# Patient Record
Sex: FEMALE | Race: WHITE
[De-identification: names, ages, dates, MRNs, and addresses within clinical notes are randomized per-mention and may not be internally consistent; named-entity substitution may affect disease eponyms.]

---

## 2017-05-17 ENCOUNTER — HOSPITAL ENCOUNTER (OUTPATIENT)
Dept: HOSPITAL 59 - SUR | Age: 72
Discharge: HOME | End: 2017-05-17
Attending: PAIN MEDICINE
Payer: COMMERCIAL

## 2017-05-17 DIAGNOSIS — M54.16: Primary | ICD-10-CM

## 2017-05-17 DIAGNOSIS — M54.17: ICD-10-CM

## 2017-05-17 PROCEDURE — 95972 ALYS CPLX SP/PN NPGT W/PRGRM: CPT

## 2017-05-17 PROCEDURE — 00300 ANES ALL PX INTEG H/N/PTRUNK: CPT

## 2017-05-17 PROCEDURE — 85002 BLEEDING TIME TEST: CPT

## 2017-05-17 PROCEDURE — 63685 INS/RPLC SPI NPG/RCVR POCKET: CPT

## 2017-05-17 PROCEDURE — 63650 IMPLANT NEUROELECTRODES: CPT

## 2017-05-17 PROCEDURE — 72020 X-RAY EXAM OF SPINE 1 VIEW: CPT

## 2017-05-17 NOTE — HISTORY AND PHYSICAL - FERRO
CHIEF COMPLAINT/HISTORY OF CHIEF COMPLAINT:  This patient presents with a 
history of an intractable lumbar radiculitis.  Due to the failure of all 
therapy a spinal cord stimulator trial was conducted on 4/10/17 with 75-90% 
pain control.  Due to the failure of all therapies and the success of the 
stimulator trial, the patient presents today for implantation of a permanent 
system.  



PAST MEDICAL HISTORY:  Hypertension.  



PAST SURGICAL HISTORY:  



MEDICATIONS ON ADMISSION:  List to be provided.  



ALLERGIES:  None identified.  



FAMILY/PSYCHOSOCIAL HISTORY:  Social history - Noncontributory.  Family history 
- Diabetes, thyroid disease, hypertension, and cerebrovascular disease.  



SYSTEMS REVIEW:  The patient seems appropriate in no acute distress.  The 
remainder of the systems review is positive for glasses, headaches, blood 
pressure problems, and degenerative arthritis.  



PHYSICAL EXAMINATION:  Height is 5'8", weight is 200 pounds.  Vital signs - Not 
available.  

HEENT:  Within normal limits.  

LUNGS:  Clear.

HEART:  Regular rate and rhythm.  

ABDOMEN:  Nontender.  

MUSCULOSKELETAL:  Examination of the musculoskeletal system shows diffuse 
tenderness throughout the lumbar spine.  Range of motion does cause pain 
throughout the low back and hip with both flexion and extension and a lower 
extremity component moving into both legs.  Ambulation - No assistive device 
utilized.  

NEUROLOGIC:  Cranial nerves are intact.  



IMPRESSION:  

LUMBAR RADICULITIS, ICD10 CODE M54.16 AND M54.17.



PLAN:  The patient is here for implantation of a permanent system spinal cord 
stimulator and internal generator based upon the successful trial and the 
failure of all other therapies.  The potential risks, side effects, and 
complications have all been carefully reviewed including dural puncture, nerve 
root injury, and spinal cord injury.  The patient understands and has 
consented.  We will consider this outpatient although an overnight stay will be 
evaluated.  







______________________________                  ____________________

KAROLINE MAGALLON D.O.                                            Date & Time



JOB NUMBER:  [id number]
MTDD

## 2017-05-23 NOTE — RADIOLOGY REPORT
EXAM:  AP THORACIC SPINE



HISTORY:  STIMULATOR PLACEMENT.



TECHNIQUE:  AP view of the thoracic spine was obtained.  



Comparison:  None.  



FINDINGS:  Two leads overlie the thoracic spine extending cephalad with the 
tips at the upper T6 level.  Mild to moderate dextroconvex scoliosis of the 
thoracic spine with at least mild degenerative disk disease.  



IMPRESSION:  

STIMULATOR LEADS OVERLIE THE THORACIC SPINE EXTENDING CEPHALAD TO THE UPPER T6 
LEVEL.  



JOB NUMBER:  [id number]
MTDD

## 2017-05-23 NOTE — OPERATIVE NOTE
DATE OF SURGERY: 05/17/2017 



PREOPERATIVE DIAGNOSIS: Intractable lumbar radiculitis, ICD10 code M54.16, M54.17. 



OPERATION:

1. Fluoroscopic-guided right translaminar epidural access T11-12, placement of spinal cord 
stimulator lead 1, a [employer] 16, 6 electrodes positioned left T6. 

2. Complex programming of lead 1 for 20 minutes. 

3. Fluoroscopic-guided epidural access right T12-L1 using a translaminar approach, placement of 
spinal cord stimulator lead 2, a [employer] 16, 6 electrodes positioned right T6. 

4. Complex programming of lead 2 for 20 minutes. 

5. Incision, subdissection, and anchoring leads 1 and 2 to supraspinous fascia using a [employer] locking anchor. 

6. Incision, subdissection, and creation of subcutaneous pouch at right posterior gluteal margin for 
placement of generator identified as a [employer] programmable rechargeable. 

7 .Tunneling between pouches, placement of external portion of lead 1 and lead 2 into generator 
pouch, each lead interfaced with bifurcate extension, each bifurcate extension interfaced to 
generator. 

8. Placement of generator into pouch, secured to posterior fascia using nonabsorbable suture, 
placement of leads in the pouch, closure of both incisions with Vicryl for fascia, running 
subcuticular Vicryl for skin, Dermabond closure. 

9. Complex recovery room programming internal generator home use 2 stimulators for 20 minutes. 



Surgeon: Luis Estrella DO



Anesthesia: Local with sedation. 



Anesthesia Provider: Albino Martinez 



Indication: This patient presents with a history of intractable lumbar radiculitis. Due to the 
failure of all therapies, a spinal cord stimulator trial was conducted in the office with 75% to 90% 
pain control. Due to the failure of all therapies and success of the trial, she opted to have a 
permanent implant. 



PROCEDURE: Intravenous line, vital sign monitoring, IV sedation by Anesthesia. Patient positioned 
prone. Sterile prep, sterile technique. The epidural interface right of the midline at T11-12 and 
12-1 infiltrated with local and then 2 separate curved access _____ needles with loss of resistance. 
At 11-12, spinal cord stimulator lead 1, a [employer] 16, 6 electrodes positioned 
left T6. With the epidural access right of the midline at 12-1, spinal cord stimulator lead 2, a 
[employer] 16, 6 electrodes positioned right of the midline at T6. Complex 
programming of lead 1 over 20 minutes followed by complex programming of lead 2 over 20 minutes 
resulting in pattern of stimulation across the back into the legs. Patient indicating we were in all 
the appropriate areas. She was given the option to implant the system, continue to program, or 
remove the system. She opted to implant. The question was repeated with the same response. At that 
point, the skin above and below both needles was infiltrated. An incision made and subcutaneous 
dissection was conducted at supraspinous fascia. Each lead was then anchored to the supraspinous 
fascia using a [employer] locking anchor. Right of the midline and below the belt line at the 
right posterior superior gluteal margin, a site picked by the patient for the generator. Skin 
infiltrated, incision made, and subcutaneous dissection was used to form a pouch of suitable size 
and depth. A tunneling tool was then used to carry both leads into the posterior gluteal pouch and 
then each lead was interfaced with bifurcate extension. Each bifurcate extension interfaced with the 
generator. Antibiotic irrigation and Bovie for hemostasis. The generator was then secured to the 
posterior fascia of the pouch using a nonabsorbable suture. The leads were placed in their own 
pouch, and then both incisions were closed with Vicryl for fascia and running subcuticular Vicryl 
for skin. A Dermabond closure was then used to approximate the edges of both wounds. She was 
transferred to the recovery room stable showing no side effects of the procedure or the sedation. 
When fully awake and alert, she was transported to the floor for observation. If she is fully awake 
and meets the criteria on the nursing floor, she can be discharged to home. 



DISCHARGE INSTRUCTIONS:

1. Sites to remain clean and dry although the Dermabond will allow showering. She should not shower 
until tomorrow or the next 24 hours. 

2. Standard medications resumed including Levaquin the antibiotic 500 mg once a day for 14 days. 

3. The office will contact the patient at home to evaluate and set up an evaluation for the 
incisional sites in 5-7 days. Until then, her activity should remain low. Limit bend, lift, push, 
pull. All other instructions provided, numbers to contact if problems given. At that point she was 
discharged. 



CC: Dr. Josh REDDY

## 2018-08-15 ENCOUNTER — HOSPITAL ENCOUNTER (OUTPATIENT)
Dept: HOSPITAL 59 - SUR | Age: 73
Discharge: HOME | End: 2018-08-15
Attending: PAIN MEDICINE
Payer: COMMERCIAL

## 2018-08-15 DIAGNOSIS — E78.00: ICD-10-CM

## 2018-08-15 DIAGNOSIS — J44.9: ICD-10-CM

## 2018-08-15 DIAGNOSIS — M54.17: ICD-10-CM

## 2018-08-15 DIAGNOSIS — K44.9: ICD-10-CM

## 2018-08-15 DIAGNOSIS — I10: ICD-10-CM

## 2018-08-15 DIAGNOSIS — G25.81: ICD-10-CM

## 2018-08-15 DIAGNOSIS — I71.4: ICD-10-CM

## 2018-08-15 DIAGNOSIS — Z95.5: ICD-10-CM

## 2018-08-15 DIAGNOSIS — R60.9: ICD-10-CM

## 2018-08-15 DIAGNOSIS — M54.16: Primary | ICD-10-CM

## 2018-08-15 DIAGNOSIS — Z79.01: ICD-10-CM

## 2018-08-15 PROCEDURE — 00300 ANES ALL PX INTEG H/N/PTRUNK: CPT

## 2018-08-15 PROCEDURE — 63685 INS/RPLC SPI NPG/RCVR POCKET: CPT

## 2018-08-15 PROCEDURE — 95972 ALYS CPLX SP/PN NPGT W/PRGRM: CPT

## 2018-08-15 NOTE — HISTORY AND PHYSICAL REPORT
DATE:  08/15/2018.



CHIEF COMPLAINT AND HISTORY OF CHIEF COMPLAINT:  This patient presents with a 
history of an intractable lumbar radiculopathy.  She had a spinal cord 
stimulator implant placed on 05/17/2017.  Although the system has been working 
well for her, there have been certain areas of her pain which we have been 
unable to capture through the current system.  A new generator has just been 
released within the last few months from [employer] which greatly 
enhances the functionality of the system providing more settings and greater 
control.  She is here for a battery change to improve the functionality of the 
system.



PAST MEDICAL HISTORY:  Hypertension.



PAST SURGICAL HISTORY:  Stimulator implant.



MEDICATIONS ON ADMISSION:  To be provided.



ALLERGIES:  None.



SOCIAL HISTORY:  Noncontributory.



FAMILY HISTORY:  Diabetes, thyroid disease, hypertension, cerebrovascular 
disease.



REVIEW OF SYSTEMS:  The patient is appropriate and in no acute distress.  The 
remainder of the systems review shows glasses, headaches, blood pressure 
problems, degenerative arthritis.



PHYSICAL EXAMINATION:  

General:  Height and weight unavailable.

Vital Signs:  Not available.

HEENT:  Within normal limits.

Lungs:  Clear.

Heart:  Regular rate and rhythm.

Abdomen:  Nontender.

Musculoskeletal:  Examination of the musculoskeletal system shows the generator 
pouch at the right posterior gluteal margin.  The incision is intact.  the 
primary underlying pain pattern is low back with a bilateral lower extremity 
extension.

Neurologic:  Cranial nerves are intact.



IMPRESSION:  

1.  LUMBAR RADICULOPATHY, ICD-10 CODE M54.16 AND M54.17.

2.  SPINAL CORD STIMULATOR INTERNAL GENERATOR NONFUNCTIONAL.



PLAN:  The patient is here on an outpatient basis for replacement of the 
generator.  The potential risks, side effects, and complications have all been 
carefully reviewed and discussed.



JOB NUMBER:  [id number]



cc:  NANO Dent

## 2018-08-16 NOTE — OPERATIVE NOTE - FERRO
DATE OF SURGERY:  08/15/18.



PREOPERATIVE DIAGNOSES:   

1.  INTRACTABLE LUMBAR RADICULOPATHY, ICD-10 CODE = M54.16 AND M54.17.

2.  SPINAL CORD STIMULATOR INTERNAL GENERATOR. 



SURGERY:          

FLUOROSCOPIC-GUIDED REMOVAL AND REPLACEMENT OF INTERNAL PULSE GENERATOR WITH A 
[employer] WAVEWRITER.  



SURGEON:  KAROLINE MAGALLON D.O.



ANESTHESIA:  LOCAL SEDATION.



ANESTHESIA PROVIDER:  LAUREN PHOENIX, CRNA.



INDICATIONS:  This patient presents with a history of intractable lumbar 
radiculopathy managed by spinal cord stimulation. She recently has had a 
certain amount of breakthrough pain, which has not been adequately covered by 
her stimulator. Multiple attempts at reprogramming failed to appreciably 
control these regions, which were upper back. She was given the option to 
change her generator to the new WaveWriter technology, which provides greater 
functionality, improved waveform generation, and more greater areas of coverage 
including moving higher into the lumbar and thoracic spine. She opted for a 
generator removal and replacement.  



SURGERY:  Intravenous line, vital sign monitoring, IV sedation, prepped and 
draped sterile technique. Patient position prone. Sterile prep, sterile 
technique at the right posterior superior gluteal margin. Generator pouch 
identified, marked and infiltrated with local, incision made, and subcutaneous 
dissection was conducted to the pouch. The pouch was opened and generator 
exteriorized and  from the internal leads. The new WaveWriter 
generator placed onto the field, interfaced with the indwelling leads. 
Antibiotic irrigation and Bovie for hemostasis. Nonabsorbable suture was placed 
to secure the generator into the pouch and then the generator pouch was closed 
using STRATAFIX suture, 2-0 for fascia and 3-0 running subcuticular for skin. 
Dermabond closure. She was transported to the Recovery Room stable, no side 
effects from the procedure or the sedation. When fully awake and alert, she was 
prepared for discharge. 



DISCHARGE INSTRUCTIONS:



1.  The site is to remain clean and dry. No sitting in water but she may 
shower. 

2.  Standard medications resumed including the antibiotic, Levaquin 500 mg once 
a day for 14 days. 

3.  She will be seen in the office in 7 to 10 days. Until then, she is to keep 
her activity levels low. All other instructions provided, numbers to contact if 
problems given. She will be discharged.  



cc: Dr. Ariana Weslh



JOB NUMBER:  [id number]
[facility]

## 2020-01-22 ENCOUNTER — HOSPITAL ENCOUNTER (OUTPATIENT)
Dept: HOSPITAL 59 - SUR | Age: 75
Discharge: HOME | End: 2020-01-22
Attending: PAIN MEDICINE
Payer: COMMERCIAL

## 2020-01-22 DIAGNOSIS — N18.4: ICD-10-CM

## 2020-01-22 DIAGNOSIS — I73.9: ICD-10-CM

## 2020-01-22 DIAGNOSIS — R60.9: ICD-10-CM

## 2020-01-22 DIAGNOSIS — J44.9: ICD-10-CM

## 2020-01-22 DIAGNOSIS — Z95.5: ICD-10-CM

## 2020-01-22 DIAGNOSIS — E78.00: ICD-10-CM

## 2020-01-22 DIAGNOSIS — Z79.01: ICD-10-CM

## 2020-01-22 DIAGNOSIS — I10: ICD-10-CM

## 2020-01-22 DIAGNOSIS — M48.062: Primary | ICD-10-CM

## 2020-01-22 DIAGNOSIS — I27.20: ICD-10-CM

## 2020-01-22 PROCEDURE — 72020 X-RAY EXAM OF SPINE 1 VIEW: CPT

## 2020-01-22 NOTE — HISTORY AND PHYSICAL - FERRO
CHIEF COMPLAINT/HISTORY OF CHIEF COMPLAINT:  This patient presents with a 
history of intractable lumbar radiculopathy.  Diagnostic imaging of the lumbar 
spine by way of CT and standard    x-rays shows five non-rib bearing levels, 
multiple level lumbar spinal stenosis including L3-L4, L4-L5 and L5-S1.  The 
primary levels appear to be L4-L5 and L5-S1 with nerve root involvement 
including L4 and L5.  Symptom pattern is aggravated and intensified with 
ambulation and subsides with sitting and forward bending.  Treatment history has
been extensive.  Physical therapy and biomechanical treatments have failed.  
Treatment history over 10-14 years has been unsuccessful.  Surgical evaluation -
No surgery.  Due to the failure of therapy and the symptom pattern consistent 
with neurogenic intermittent claudication and multiple level spinal stenosis, 
she is here for interspinal spacer placement.  



PAST MEDICAL HISTORY:  Hypertension.  



PAST SURGICAL HISTORY:  Spinal cord stimulator.  



MEDICATIONS ON ADMISSION:  List to be provided.  



ALLERGIES:  None.  



FAMILY/PSYCHOSOCIAL HISTORY:  Social history - Noncontributory.  Family history 
- Diabetes, thyroid disease, hypertension, and cerebrovascular disease.  



SYSTEMS REVIEW:  The patient is appropriate in no acute distress.  The remainder
of the systems review is positive for glasses, headaches, blood pressure, and 
degenerative arthritis.  



PHYSICAL EXAMINATION:  No height and weight.  No vital signs.  

HEENT:  Within normal limits.  

LUNGS:  Clear.

HEART:  Rapid and regular.  

ABDOMEN:  Nontender.  

MUSCULOSKELETAL:  Examination of the musculoskeletal system shows the primary 
pain pattern to extend towards the hips outer front and back surface of the 
legs.  Ambulation produces pain.  Sitting and forward bending reduces pain.  
Motor and sensory field evaluation of the lower extremities show mild sensory 
and mild motor abnormalities.  

NEUROLOGIC:  Cranial nerves are intact.  



IMPRESSION:  

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
ICD-10 CODE M48.062.  



PLAN:  The patient is here for interspinal spacer placement at L4-L5 and L5-S1. 
The L5-S1 level will be approached because the S1 level has an appropriate 
spinous process.  There are multiple levels of spinal stenosis the primary 
contributors to her pain pattern which would appear by anatomy to be the L4 and 
the L5 level.  Interspinal spacer placement has been discussed and reviewed in 
detail.  Six weeks of restrictions limiting bend, lift, push, and pull.  The 
risks, side effects and complications have all been carefully reviewed and 
discussed.  She was put in contact with a clinical specialist who also reviewed 
the same.  The procedure will be considered outpatient although an overnight 
stay will be evaluated.  



JOB NUMBER:  [id number]

MTDD

## 2020-01-23 NOTE — RADIOLOGY REPORT
EXAMINATION: Lumbosacral Spine Single View

EXAM DATE:  1/23/2020 7:55 AM



TECHNIQUE:  Lateral view 



INDICATION:  S/P VERTFLEX IMPLANT

COMPARISON:  None 



ENCOUNTER: Initial

_________________________



FINDINGS: 



Normal vertebral body heights. Diffuse interspace narrowing with levoscoliosis. Stimulator projects o
nathaniel the left gluteal regions with epidural wires lower thoracic spine. VERTFLEX IMPLANT L4-L5 L5-S1. 
Aortic calcification.

_________________________



IMPRESSION:



No acute abnormality







Dictated by: Josh Arechiga MD on 1/23/2020 7:49 PM.

Electronically signed by: Josh Arechiga MD on 1/23/2020 7:50 PM.

## 2020-01-27 NOTE — OPERATIVE NOTE - FERRO
DATE OF SURGERY:  01/22/2020



PREOPERATIVE DIAGNOSIS:  

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
ICD-10 CODE M48.062.



OPERATION:  

1.  FLUOROSCOPICALLY GUIDED PLACEMENT OF INTERSPINAL SPACER AT L5-S1 WITH 
RADIOLOGIC SUPERVISION AND INTERPRETATION.  

2.  FLUOROSCOPICALLY GUIDED PLACEMENT OF INTERSPINAL SPACER AT L4-L5 USING 
RADIOLOGIC SUPERVISION AND INTERPRETATION.  



SURGEON:  Luis Estrella D.O.



ANESTHESIA:  Local sedation.  



ANESTHESIA PROVIDER:  KOKI Back CRNA



INDICATION:  This patient presents with multiple level lumbar spinal stenosis 
and secondary neurogenic intermittent claudication or pain into the extremities.
 Diagnostic studies do show the primary disks     L2-L3, L3-L4, L4-L5 and L5-S1 
disk levels showing spinal stenosis at the levels at L4-L5 and L5-S1, all 
involving the nerve roots to the hip outside as well as butt cheek and back of 
the leg.  Her pain pattern is front and back of the leg.  It is aggravated with 
activity and subsides with sitting or forward bending.  Due to the extensive 
multiple year history achievements and failure, she is here for interspinal 
spacer placement to distract and indirectly decompress the spinal space at L4-L5
and L5-S1.  



PROCEDURE:  Intravenous line, vital sign monitoring, IV sedation, prepped and 
draped, sterile technique.  The patient was positioned prone.  Sterile prep, 
sterile technique.  Under imaging the interspinal space at L5-S1 was marked, 
infiltrated, a dilator was placed between the spinous processes of L5 and S1 
using AP and lateral imaging and staying posterior to the lamina.  A second 
dilator to distract the spinous processes was then inserted over the first, 
placed into position  the spinous process of L5 from S1.  A debriding 
device was then used to debride the interspinous ligament between the spinous 
processes and then a measuring or gauging device was used to measure the 
distance between the spinous process, this measured at 12 mm.  A 12 mm 
interspinal spacer was then inserted using AP and lateral imaging and staying 
posterior to the lamina.  The device was then affixed into place by extending 
the extensions upper and lower secured between the spinous process of L5 and S1.
 This was confirmed on AP and lateral imaging.  It was then gently downed to a 
position up against the lamina.  The device was removed and imaging confirmed 
placement posterior to the lamina with the upper extensions under the spinous 
process of L5 and the lower extensions above the spinous process of S1.  Moving 
now to the L4-L5 interspace, the skin was infiltrated, AP and lateral imaging 
was used after 22-gauge needle positioned to confirm midline.  A midline dilator
was then placed between the spinous processes posterior to the lamina and 
confirmed AP and lateral imaging.  A second dilator was then placed over the 
first to distract the spinous processes between L4 and L5 staying posterior to 
the lamina.  The inter-dilator was removed and debridement was then of the 
interspinous ligament performed.   A measuring device was used to measure the 
distance between the spinous process of L4 and L5, also noted at 12 mm.  A 12 mm
interspinal spacer was then inserted under imaging, AP and lateral staying 
posterior to the lamina, the interspinal spacer was then expanded securing the 
upper extensions and the lower extensions at the spinous processes.  The upper 
extensions at the lower margin of the spinous process of L4 and the lower 
extensions at the upper aspect of the spinous process of L5.  The device was 
then tapped into position up against the lamina.  AP and lateral imaging was 
used to confirm appropriate placement of the two spacers to the posterior 
lamina.  Antibiotic irrigation was then performed, the incision was then closed 
at each of the two levels with a Vicryl for the fascia and a nylon intermittent 
for the skin.  An OP-Site dressing was placed after the antibiotic irrigation.  
She was transported to the Recovery Room stable.  No side effects from the 
procedure or sedation.  She was monitored until stable and then prepared for 
discharge.  



DISCHARGE INSTRUCTIONS:

1.  The sites are to remain clean and dry.  She can shower, but she needs to 
keep the sites dry.  

2.  Standard medications are resumed including the antibiotic Levaquin 500 mg 
once a day for fourteen days.  A prescription has been provided to manage 
incisional pain.  

3.  The office is to contact the patient in the next 24 hours to set up a time 
in 7-10 days for us to evaluate the sites, until then she is to keep her 
activities low.  She has six weeks of restrictions limiting bend, lift, push and
pull.   She will be evaluated at two week intervals for a total of six weeks.  
All other instructions are provided.  The numbers to contact if problems given. 
She was then discharged.  



JOB NUMBER:  [id number]

[facility]